# Patient Record
Sex: FEMALE | Race: WHITE
[De-identification: names, ages, dates, MRNs, and addresses within clinical notes are randomized per-mention and may not be internally consistent; named-entity substitution may affect disease eponyms.]

---

## 2017-07-28 ENCOUNTER — HOSPITAL ENCOUNTER (OUTPATIENT)
Dept: HOSPITAL 45 - C.LAB | Age: 57
Discharge: HOME | End: 2017-07-28
Payer: COMMERCIAL

## 2017-07-28 DIAGNOSIS — R73.9: ICD-10-CM

## 2017-07-28 DIAGNOSIS — I10: Primary | ICD-10-CM

## 2017-07-28 LAB
ANION GAP SERPL CALC-SCNC: 9 MMOL/L (ref 3–11)
BASOPHILS # BLD: 0.03 K/UL (ref 0–0.2)
BASOPHILS NFR BLD: 0.4 %
BUN SERPL-MCNC: 27 MG/DL (ref 7–18)
BUN/CREAT SERPL: 40.1 (ref 10–20)
CALCIUM SERPL-MCNC: 9.8 MG/DL (ref 8.5–10.1)
CHLORIDE SERPL-SCNC: 107 MMOL/L (ref 98–107)
CHOLEST/HDLC SERPL: 3 {RATIO}
CO2 SERPL-SCNC: 24 MMOL/L (ref 21–32)
COMPLETE: YES
CREAT SERPL-MCNC: 0.67 MG/DL (ref 0.6–1.2)
EOSINOPHIL NFR BLD AUTO: 338 K/UL (ref 130–400)
EST. AVERAGE GLUCOSE BLD GHB EST-MCNC: 117 MG/DL
GLUCOSE SERPL-MCNC: 111 MG/DL (ref 70–99)
GLUCOSE UR QL: 55 MG/DL
HCT VFR BLD CALC: 40.4 % (ref 37–47)
IG%: 0.4 %
IMM GRANULOCYTES NFR BLD AUTO: 20 %
KETONES UR QL STRIP: 97 MG/DL
LYMPHOCYTES # BLD: 1.34 K/UL (ref 1.2–3.4)
MCH RBC QN AUTO: 33.3 PG (ref 25–34)
MCHC RBC AUTO-ENTMCNC: 34.2 G/DL (ref 32–36)
MCV RBC AUTO: 97.3 FL (ref 80–100)
MONOCYTES NFR BLD: 9.4 %
NEUTROPHILS # BLD AUTO: 2.2 %
NEUTROPHILS NFR BLD AUTO: 67.6 %
NITRITE UR QL STRIP: 73 MG/DL (ref 0–150)
PH UR: 167 MG/DL (ref 0–200)
PMV BLD AUTO: 9.9 FL (ref 7.4–10.4)
POTASSIUM SERPL-SCNC: 3.9 MMOL/L (ref 3.5–5.1)
RBC # BLD AUTO: 4.15 M/UL (ref 4.2–5.4)
SODIUM SERPL-SCNC: 140 MMOL/L (ref 136–145)
VERY LOW DENSITY LIPOPROT CALC: 15 MG/DL
WBC # BLD AUTO: 6.7 K/UL (ref 4.8–10.8)

## 2017-12-14 ENCOUNTER — HOSPITAL ENCOUNTER (OUTPATIENT)
Dept: HOSPITAL 45 - C.LAB | Age: 57
Discharge: HOME | End: 2017-12-14
Payer: COMMERCIAL

## 2017-12-14 DIAGNOSIS — M19.90: Primary | ICD-10-CM

## 2017-12-14 LAB
ANION GAP SERPL CALC-SCNC: 7 MMOL/L (ref 3–11)
BASOPHILS # BLD: 0.03 K/UL (ref 0–0.2)
BASOPHILS NFR BLD: 0.4 %
BUN SERPL-MCNC: 21 MG/DL (ref 7–18)
BUN/CREAT SERPL: 32.8 (ref 10–20)
CALCIUM SERPL-MCNC: 9.7 MG/DL (ref 8.5–10.1)
CHLORIDE SERPL-SCNC: 106 MMOL/L (ref 98–107)
CHOLEST/HDLC SERPL: 3.4 {RATIO}
CO2 SERPL-SCNC: 25 MMOL/L (ref 21–32)
COMPLETE: YES
CREAT SERPL-MCNC: 0.65 MG/DL (ref 0.6–1.2)
EOSINOPHIL NFR BLD AUTO: 291 K/UL (ref 130–400)
EST. AVERAGE GLUCOSE BLD GHB EST-MCNC: 114 MG/DL
GLUCOSE SERPL-MCNC: 103 MG/DL (ref 70–99)
GLUCOSE UR QL: 56 MG/DL
HCT VFR BLD CALC: 40 % (ref 37–47)
IG%: 0.2 %
IMM GRANULOCYTES NFR BLD AUTO: 16.9 %
KETONES UR QL STRIP: 106 MG/DL
LYMPHOCYTES # BLD: 1.36 K/UL (ref 1.2–3.4)
MCH RBC QN AUTO: 33.4 PG (ref 25–34)
MCHC RBC AUTO-ENTMCNC: 33.5 G/DL (ref 32–36)
MCV RBC AUTO: 99.8 FL (ref 80–100)
MONOCYTES NFR BLD: 9.1 %
NEUTROPHILS # BLD AUTO: 1.9 %
NEUTROPHILS NFR BLD AUTO: 71.5 %
NITRITE UR QL STRIP: 134 MG/DL (ref 0–150)
PH UR: 189 MG/DL (ref 0–200)
PMV BLD AUTO: 10.1 FL (ref 7.4–10.4)
POTASSIUM SERPL-SCNC: 3.8 MMOL/L (ref 3.5–5.1)
RBC # BLD AUTO: 4.01 M/UL (ref 4.2–5.4)
SODIUM SERPL-SCNC: 138 MMOL/L (ref 136–145)
VERY LOW DENSITY LIPOPROT CALC: 27 MG/DL
WBC # BLD AUTO: 8.04 K/UL (ref 4.8–10.8)

## 2018-02-07 ENCOUNTER — HOSPITAL ENCOUNTER (OUTPATIENT)
Dept: HOSPITAL 45 - C.MRI | Age: 58
Discharge: HOME | End: 2018-02-07
Attending: PODIATRIST
Payer: COMMERCIAL

## 2018-02-07 DIAGNOSIS — M76.61: Primary | ICD-10-CM

## 2018-02-07 DIAGNOSIS — Z96.7: ICD-10-CM

## 2018-02-07 DIAGNOSIS — S93.421D: ICD-10-CM
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
## 2018-02-07 DIAGNOSIS — S93.431D: ICD-10-CM

## 2018-02-07 DIAGNOSIS — S93.491D: ICD-10-CM

## 2018-02-07 NOTE — DIAGNOSTIC IMAGING REPORT
R LOWER EXT JOINT WITHOUT



HISTORY:  57 years-old Female RIGHT ANKLE PAIN,ACHILLIES TENDINITIS chronic

right ankle pain with prior right Achilles surgery September 2016



COMPARISON: MRI 8/24/2016



TECHNIQUE: Multiplanar multisequence MRI of the right ankle was obtained without

contrast.



FINDINGS: 



LATERAL LIGAMENT COMPLEX: There is thickening with intermediate T2 signal of the

anterior talofibular ligament compatible with chronic sprain. The

Calcaneofibular ligament and posterior talofibular ligaments are intact.



SYNDESMOTIC LIGAMENTS: Chronic sprain of the anterior-inferior tibiofibular

ligament. The interosseous membrane and posterior-inferior tibiofibular

ligaments are intact.



DELTOID LIGAMENT COMPLEX: The superficial and deep components of the deltoid

ligament are intact and demonstrate evidence of chronic sprain.



ANTERIOR TENDONS: The tibialis anterior, extensor hallucis longus and extensor

digitorum longus tendons are normal in position, morphology and signal.



LATERAL TENDONS: There is mild nonspecific edema involving the myotendinous

junction of the peroneus brevis as seen on image 1 of series 9 which is

partially imaged. There is mild tendinosis of the intact peroneus longus and

brevis tendons.



MEDIAL TENDONS: The posterior tibialis, flexor digitorum longus and flexor

hallucis longus tendons are intact.



PLANTAR FASCIA: The medial and lateral bundles of the plantar fascia are

thickened and appear intact. Moderate sized plantar calcaneal osteophyte. There

is mild edema noted adjacent to the lateral cord plantar fascia compatible with

acute on chronic plantar fasciitis. There is no evidence of plantar fascial

nodules or tear.    



ACHILLES TENDON: The Achilles tendon is markedly thickened and demonstrates

micrometallic artifact from prior surgical repair. Intrasubstance fluid signal

seen within the mid fibers of the Achilles tendon for a length of 4.7 cm. No

significant surrounding soft tissue edema. No definite retraction identified.



SINUS TARSI: There is normal fat signal within the sinus tarsi. The interosseous

and cervical ligaments are normal. The navicular-calcaneal (spring) ligament

iswithout acute abnormality.



TARSAL TUNNEL: There are no obstructing lesions within the tarsal tunnel.



BONE MARROW: There is no fracture or marrow replacing process. No osteochondral

defect or definite intra-articular loose body. 1.0 cm corticated bone fragment

is seen adjacent to the medial malleolus remote avulsion fracture.



SOFT TISSUES: Small joint effusion with trace fluid within the posterior recess.

Mild nonspecific subcutaneous edema about the lower leg and ankle.





IMPRESSION:

1. Markedly thickened heterogeneous appearance of the Achilles tendon with

micrometallic artifact from prior surgical repair. Intrasubstance fluid within

the mid fibers of the Achilles tendon is seen extending for a length of 4.7 cm

in craniocaudal dimension. No significant surrounding edema or retraction

identified. 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

Correlate with clinical exam and patient symptoms.

2. Mild acute on chronic plantar fasciitis.

3. Chronic sprain of the anterior talofibular ligament, deltoid ligament and

anteroinferior tibiofibular ligament.

4. No acute fracture.

5. Small joint effusion.



The above report was generated using voice recognition software. It may contain

grammatical, syntax or spelling errors.









Electronically signed by:  Cheko Flood M.D.

2/7/2018 3:19 PM



Dictated Date/Time:  2/7/2018 3:03 PM

## 2018-08-22 ENCOUNTER — HOSPITAL ENCOUNTER (OUTPATIENT)
Dept: HOSPITAL 45 - C.LABBC | Age: 58
Discharge: HOME | End: 2018-08-22
Payer: COMMERCIAL

## 2018-08-22 DIAGNOSIS — M19.90: ICD-10-CM

## 2018-08-22 DIAGNOSIS — G89.29: ICD-10-CM

## 2018-08-22 DIAGNOSIS — I10: Primary | ICD-10-CM

## 2018-08-22 LAB
ALT SERPL-CCNC: 13 U/L (ref 12–78)
AST SERPL-CCNC: 12 U/L (ref 15–37)
BASOPHILS # BLD: 0.03 K/UL (ref 0–0.2)
BASOPHILS NFR BLD: 0.4 %
BUN SERPL-MCNC: 20 MG/DL (ref 7–18)
CALCIUM SERPL-MCNC: 8.9 MG/DL (ref 8.5–10.1)
CO2 SERPL-SCNC: 28 MMOL/L (ref 21–32)
CREAT SERPL-MCNC: 0.8 MG/DL (ref 0.6–1.2)
EOS ABS #: 0.32 K/UL (ref 0–0.5)
EOSINOPHIL NFR BLD AUTO: 313 K/UL (ref 130–400)
GLUCOSE SERPL-MCNC: 75 MG/DL (ref 70–99)
HBA1C MFR BLD: 5.6 % (ref 4.5–5.6)
HCT VFR BLD CALC: 38.9 % (ref 37–47)
HGB BLD-MCNC: 12.8 G/DL (ref 12–16)
IG#: 0.09 K/UL (ref 0–0.02)
IMM GRANULOCYTES NFR BLD AUTO: 23.1 %
LYMPHOCYTES # BLD: 1.88 K/UL (ref 1.2–3.4)
MCH RBC QN AUTO: 33.3 PG (ref 25–34)
MCHC RBC AUTO-ENTMCNC: 32.9 G/DL (ref 32–36)
MCV RBC AUTO: 101.3 FL (ref 80–100)
MONO ABS #: 0.92 K/UL (ref 0.11–0.59)
MONOCYTES NFR BLD: 11.3 %
NEUT ABS #: 4.89 K/UL (ref 1.4–6.5)
NEUTROPHILS # BLD AUTO: 3.9 %
NEUTROPHILS NFR BLD AUTO: 60.2 %
PMV BLD AUTO: 10.5 FL (ref 7.4–10.4)
POTASSIUM SERPL-SCNC: 3.9 MMOL/L (ref 3.5–5.1)
RED CELL DISTRIBUTION WIDTH CV: 15.5 % (ref 11.5–14.5)
RED CELL DISTRIBUTION WIDTH SD: 56.4 FL (ref 36.4–46.3)
SODIUM SERPL-SCNC: 138 MMOL/L (ref 136–145)
WBC # BLD AUTO: 8.13 K/UL (ref 4.8–10.8)

## 2018-08-24 ENCOUNTER — HOSPITAL ENCOUNTER (OUTPATIENT)
Dept: HOSPITAL 45 - C.MRIBC | Age: 58
Discharge: HOME | End: 2018-08-24
Attending: ORTHOPAEDIC SURGERY
Payer: COMMERCIAL

## 2018-08-24 DIAGNOSIS — M25.512: Primary | ICD-10-CM

## 2018-08-24 NOTE — DIAGNOSTIC IMAGING REPORT
L INJECTION SHOULDER PRE MRI



CLINICAL HISTORY: 58 years-old Female presenting with LEFT SHOULDER PAIN.



COMPARISON: 8/13/2018.



PROCEDURE: 

The risks, benefits, and alternatives to the procedure were discussed with the

patient. Written informed consent was obtained. The patient was placed supine on

the fluoroscopy table, and a left shoulder injection was performed under

fluoroscopic guidance. 



The area was prepped and draped in the usual sterile fashion. The skin and soft

tissues anesthetized with local 1% lidocaine. The left shoulder joint was

accessed utilizing a 22-gauge needle, and approximately 12 cc of a mixture of

gadolinium contrast, Optiray 300, and saline was injected into the joint space

under fluoroscopic guidance. There was normal distention of the capsule. 



The procedure was well tolerated without immediate complication. The patient was

then transferred to MRI for MR arthrography.



Fluoroscopy dosage (mGy): Not available.



Fluoroscopy time: 11 seconds.



Number or time of fluoroscopic spot images: 0.



IMPRESSION: 

Successful injection of the left shoulder under fluoroscopic guidance.







Electronically signed by:  Delbert Barrientos M.D.

8/24/2018 12:17 PM



Dictated Date/Time:  8/24/2018 12:16 PM

## 2018-08-24 NOTE — DIAGNOSTIC IMAGING REPORT
MRI ARTHROGRAM OF THE LEFT SHOULDER



CLINICAL HISTORY: Left shoulder pain.



COMPARISON STUDY:  Left shoulder radiographs August 13, 2018 and MRI of the left

shoulder March 7, 2016.



TECHNIQUE: Following a fluoroscopically guided left shoulder arthrogram and

utilizing a 1.5 Ирина magnet, multiplanar, multiecho imaging of the left

shoulder was performed without intravenous contrast.



FINDINGS: There is contrast within the left glenohumeral joint. In addition,

contrast extends into the subacromial/subdeltoid bursa due to full-thickness

supraspinatus tear. The contrast also extends into the left acromioclavicular

joint which contains a large amount of fluid. This joint is widened. There is

possible erosion of the acromion. No suspicious marrow replacement is present.

Note is made of a large full-thickness tear of supraspinatus that extends for

1.1 cm. This is new since MRI of March 7, 2016. Extensive tendinopathy is noted.

Note is made of mild tendon retraction without muscular atrophy. There is also

high-grade partial-thickness articular surface tear of infraspinatus with

suspected associated interstitial tear. There is high-grade partial-thickness

tear of subscapularis. Proximal long head of the biceps tendon is not well

visualized and may be diminutive. This suggest a tear. The glenoid labrum is

truncated and diminutive.



IMPRESSION:  



1. Extensive tendinopathy with full-thickness tear of distal supraspinatus with

mild tendon retraction. No significant muscular atrophy. Significant progression

since MRI of March 7, 2016.



2. High-grade partial-thickness tear of distal infraspinatus with associated

interstitial tear. High-grade partial tear of subscapularis.



3. Large amount of fluid within the acromioclavicular joint which is widened.

Addition, filling contains contrast likely extending from the subacromial bursa.

Apparent bone loss/erosion of the distal acromion.



4. Tear of the proximal long head of the biceps tendon which is not well

visualized on this exam.



5. Chronic labral tear.







Electronically signed by:  Krishan Trejo M.D.

8/24/2018 12:57 PM



Dictated Date/Time:  8/24/2018 12:29 PM